# Patient Record
Sex: FEMALE | Race: WHITE | NOT HISPANIC OR LATINO | Employment: OTHER | URBAN - NONMETROPOLITAN AREA
[De-identification: names, ages, dates, MRNs, and addresses within clinical notes are randomized per-mention and may not be internally consistent; named-entity substitution may affect disease eponyms.]

---

## 2023-01-01 ENCOUNTER — INFUSION (OUTPATIENT)
Dept: HEMATOLOGY/ONCOLOGY | Facility: CLINIC | Age: 73
End: 2023-01-01
Payer: MEDICARE

## 2023-01-01 ENCOUNTER — INPATIENT HOSPITAL (INPATIENT)
Dept: URBAN - METROPOLITAN AREA HOSPITAL 50 | Facility: HOSPITAL | Age: 73
End: 2023-01-01

## 2023-01-01 ENCOUNTER — TELEPHONE (OUTPATIENT)
Dept: HEMATOLOGY/ONCOLOGY | Facility: CLINIC | Age: 73
End: 2023-01-01
Payer: MEDICARE

## 2023-01-01 ENCOUNTER — APPOINTMENT (OUTPATIENT)
Dept: HEMATOLOGY/ONCOLOGY | Facility: CLINIC | Age: 73
End: 2023-01-01
Payer: MEDICARE

## 2023-01-01 ENCOUNTER — DOCUMENTATION (OUTPATIENT)
Dept: HEMATOLOGY/ONCOLOGY | Facility: CLINIC | Age: 73
End: 2023-01-01
Payer: MEDICARE

## 2023-01-01 ENCOUNTER — TELEPHONE (OUTPATIENT)
Dept: HEMATOLOGY/ONCOLOGY | Facility: HOSPITAL | Age: 73
End: 2023-01-01
Payer: MEDICARE

## 2023-01-01 ENCOUNTER — LAB (OUTPATIENT)
Dept: LAB | Facility: LAB | Age: 73
End: 2023-01-01
Payer: MEDICARE

## 2023-01-01 ENCOUNTER — OFFICE VISIT (OUTPATIENT)
Dept: HEMATOLOGY/ONCOLOGY | Facility: CLINIC | Age: 73
End: 2023-01-01
Payer: MEDICARE

## 2023-01-01 ENCOUNTER — HOSPITAL ENCOUNTER (OUTPATIENT)
Dept: VASCULAR MEDICINE | Facility: CLINIC | Age: 73
Discharge: HOME | End: 2023-11-14
Payer: MEDICARE

## 2023-01-01 ENCOUNTER — SOCIAL WORK (OUTPATIENT)
Dept: HEMATOLOGY/ONCOLOGY | Facility: CLINIC | Age: 73
End: 2023-01-01
Payer: MEDICARE

## 2023-01-01 ENCOUNTER — DOCUMENTATION (OUTPATIENT)
Dept: HEMATOLOGY/ONCOLOGY | Facility: CLINIC | Age: 73
End: 2023-01-01

## 2023-01-01 VITALS
WEIGHT: 124.89 LBS | HEART RATE: 114 BPM | DIASTOLIC BLOOD PRESSURE: 66 MMHG | RESPIRATION RATE: 16 BRPM | OXYGEN SATURATION: 92 % | SYSTOLIC BLOOD PRESSURE: 107 MMHG | TEMPERATURE: 98.2 F | BODY MASS INDEX: 26.22 KG/M2

## 2023-01-01 VITALS
HEART RATE: 104 BPM | TEMPERATURE: 97.3 F | RESPIRATION RATE: 18 BRPM | OXYGEN SATURATION: 93 % | BODY MASS INDEX: 26.12 KG/M2 | WEIGHT: 124.45 LBS | SYSTOLIC BLOOD PRESSURE: 109 MMHG | DIASTOLIC BLOOD PRESSURE: 70 MMHG

## 2023-01-01 VITALS
BODY MASS INDEX: 25.59 KG/M2 | WEIGHT: 121.91 LBS | HEIGHT: 58 IN | HEART RATE: 107 BPM | TEMPERATURE: 97.7 F | SYSTOLIC BLOOD PRESSURE: 118 MMHG | DIASTOLIC BLOOD PRESSURE: 79 MMHG | OXYGEN SATURATION: 94 % | RESPIRATION RATE: 18 BRPM

## 2023-01-01 VITALS
OXYGEN SATURATION: 93 % | TEMPERATURE: 97.3 F | WEIGHT: 125.77 LBS | SYSTOLIC BLOOD PRESSURE: 97 MMHG | HEART RATE: 94 BPM | BODY MASS INDEX: 26.4 KG/M2 | DIASTOLIC BLOOD PRESSURE: 63 MMHG | RESPIRATION RATE: 14 BRPM

## 2023-01-01 VITALS
BODY MASS INDEX: 26.31 KG/M2 | DIASTOLIC BLOOD PRESSURE: 71 MMHG | OXYGEN SATURATION: 92 % | WEIGHT: 125.33 LBS | HEART RATE: 108 BPM | TEMPERATURE: 97 F | RESPIRATION RATE: 14 BRPM | SYSTOLIC BLOOD PRESSURE: 113 MMHG

## 2023-01-01 VITALS
OXYGEN SATURATION: 94 % | RESPIRATION RATE: 18 BRPM | HEART RATE: 102 BPM | SYSTOLIC BLOOD PRESSURE: 118 MMHG | BODY MASS INDEX: 26.1 KG/M2 | TEMPERATURE: 97.7 F | WEIGHT: 124.34 LBS | DIASTOLIC BLOOD PRESSURE: 72 MMHG

## 2023-01-01 DIAGNOSIS — C83.18 MANTLE CELL LYMPHOMA OF LYMPH NODES OF MULTIPLE REGIONS (MULTI): ICD-10-CM

## 2023-01-01 DIAGNOSIS — C83.10 MANTLE CELL LYMPHOMA, UNSPECIFIED SITE: ICD-10-CM

## 2023-01-01 DIAGNOSIS — K92.1 MELENA: ICD-10-CM

## 2023-01-01 DIAGNOSIS — C83.18 MANTLE CELL LYMPHOMA OF LYMPH NODES OF MULTIPLE REGIONS (MULTI): Primary | ICD-10-CM

## 2023-01-01 DIAGNOSIS — A04.72 ENTEROCOLITIS DUE TO CLOSTRIDIUM DIFFICILE, NOT SPECIFIED AS: ICD-10-CM

## 2023-01-01 DIAGNOSIS — D62 ACUTE POSTHEMORRHAGIC ANEMIA: ICD-10-CM

## 2023-01-01 DIAGNOSIS — M79.604 PAIN IN RIGHT LEG: ICD-10-CM

## 2023-01-01 DIAGNOSIS — C83.13 MANTLE CELL LYMPHOMA, INTRA-ABDOMINAL LYMPH NODES: ICD-10-CM

## 2023-01-01 DIAGNOSIS — C83.10 MANTLE CELL LYMPHOMA, UNSPECIFIED SITE (MULTI): Primary | ICD-10-CM

## 2023-01-01 DIAGNOSIS — K29.50 UNSPECIFIED CHRONIC GASTRITIS WITHOUT BLEEDING: ICD-10-CM

## 2023-01-01 LAB
ACANTHOCYTES PRESENCE IN BLOOD BY LIGHT MICROSCOPY: NORMAL
ALANINE AMINOTRANSFERASE (SGPT) (U/L) IN SER/PLAS: 11 U/L (ref 7–45)
ALANINE AMINOTRANSFERASE (SGPT) (U/L) IN SER/PLAS: 9 U/L (ref 7–45)
ALBUMIN (G/DL) IN SER/PLAS: 4 G/DL (ref 3.4–5)
ALBUMIN (G/DL) IN SER/PLAS: 4.1 G/DL (ref 3.4–5)
ALBUMIN ELP: 3.6 G/DL (ref 3.4–5)
ALBUMIN ELP: 3.9 G/DL (ref 3.4–5)
ALBUMIN SERPL BCP-MCNC: 3.7 G/DL (ref 3.4–5)
ALBUMIN SERPL BCP-MCNC: 4 G/DL (ref 3.4–5)
ALBUMIN: 3.4 G/DL (ref 3.4–5)
ALBUMIN: 3.8 G/DL (ref 3.4–5)
ALKALINE PHOSPHATASE (U/L) IN SER/PLAS: 117 U/L (ref 33–136)
ALKALINE PHOSPHATASE (U/L) IN SER/PLAS: 118 U/L (ref 33–136)
ALP SERPL-CCNC: 126 U/L (ref 33–136)
ALP SERPL-CCNC: 161 U/L (ref 33–136)
ALPHA 1 GLOBULIN: 0.3 G/DL (ref 0.2–0.6)
ALPHA 1 GLOBULIN: 0.4 G/DL (ref 0.2–0.6)
ALPHA 1: 0.3 G/DL (ref 0.2–0.6)
ALPHA 1: 0.4 G/DL (ref 0.2–0.6)
ALPHA 2 GLOBULIN: 0.9 G/DL (ref 0.4–1.1)
ALPHA 2 GLOBULIN: 1 G/DL (ref 0.4–1.1)
ALPHA 2: 0.9 G/DL (ref 0.4–1.1)
ALPHA 2: 0.9 G/DL (ref 0.4–1.1)
ALT SERPL W P-5'-P-CCNC: 10 U/L (ref 7–45)
ALT SERPL W P-5'-P-CCNC: 12 U/L (ref 7–45)
ANION GAP IN SER/PLAS: 12 MMOL/L (ref 10–20)
ANION GAP IN SER/PLAS: 14 MMOL/L (ref 10–20)
ANION GAP SERPL CALC-SCNC: 13 MMOL/L (ref 10–20)
ANION GAP SERPL CALC-SCNC: 15 MMOL/L (ref 10–20)
ASPARTATE AMINOTRANSFERASE (SGOT) (U/L) IN SER/PLAS: 19 U/L (ref 9–39)
ASPARTATE AMINOTRANSFERASE (SGOT) (U/L) IN SER/PLAS: 20 U/L (ref 9–39)
AST SERPL W P-5'-P-CCNC: 19 U/L (ref 9–39)
AST SERPL W P-5'-P-CCNC: 23 U/L (ref 9–39)
B2 MICROGLOB SERPL-MCNC: 5.1 MG/L (ref 0.7–2.2)
B2 MICROGLOB SERPL-MCNC: 6.2 MG/L (ref 0.7–2.2)
BASOPHILS # BLD MANUAL: 0 X10*3/UL (ref 0–0.1)
BASOPHILS # BLD MANUAL: 0.59 X10*3/UL (ref 0–0.1)
BASOPHILS (10*3/UL) IN BLOOD BY MANUAL COUNT - WAM: 0 X10E9/L (ref 0–0.1)
BASOPHILS (10*3/UL) IN BLOOD BY MANUAL COUNT - WAM: 0 X10E9/L (ref 0–0.1)
BASOPHILS NFR BLD MANUAL: 0 %
BASOPHILS NFR BLD MANUAL: 0.7 %
BASOPHILS/100 LEUKOCYTES IN BLOOD BY MANUAL COUNT - WAM: 0 % (ref 0–2)
BASOPHILS/100 LEUKOCYTES IN BLOOD BY MANUAL COUNT - WAM: 0 % (ref 0–2)
BETA GLOBULIN: 0.8 G/DL (ref 0.5–1.2)
BETA GLOBULIN: 1 G/DL (ref 0.5–1.2)
BETA-2-MICROGLOBULIN (MG/L) IN SERUM: 4.2 MG/L (ref 0.7–2.2)
BETA-2-MICROGLOBULIN (MG/L) IN SERUM: 4.5 MG/L (ref 0.7–2.2)
BETA: 0.8 G/DL (ref 0.5–1.2)
BETA: 0.8 G/DL (ref 0.5–1.2)
BILIRUB SERPL-MCNC: 0.3 MG/DL (ref 0–1.2)
BILIRUB SERPL-MCNC: 0.4 MG/DL (ref 0–1.2)
BILIRUBIN TOTAL (MG/DL) IN SER/PLAS: 0.4 MG/DL (ref 0–1.2)
BILIRUBIN TOTAL (MG/DL) IN SER/PLAS: 0.4 MG/DL (ref 0–1.2)
BUN SERPL-MCNC: 18 MG/DL (ref 6–23)
BUN SERPL-MCNC: 18 MG/DL (ref 6–23)
BURR CELLS BLD QL SMEAR: ABNORMAL
BURR CELLS PRESENCE IN BLOOD BY LIGHT MICROSCOPY: NORMAL
CALCIUM (MG/DL) IN SER/PLAS: 10 MG/DL (ref 8.6–10.6)
CALCIUM (MG/DL) IN SER/PLAS: 9.2 MG/DL (ref 8.6–10.6)
CALCIUM SERPL-MCNC: 10.1 MG/DL (ref 8.6–10.6)
CALCIUM SERPL-MCNC: 9.3 MG/DL (ref 8.6–10.6)
CARBON DIOXIDE, TOTAL (MMOL/L) IN SER/PLAS: 28 MMOL/L (ref 21–32)
CARBON DIOXIDE, TOTAL (MMOL/L) IN SER/PLAS: 32 MMOL/L (ref 21–32)
CBC DIFFERENTIAL PATH REVIEW: NORMAL
CHLORIDE (MMOL/L) IN SER/PLAS: 102 MMOL/L (ref 98–107)
CHLORIDE (MMOL/L) IN SER/PLAS: 105 MMOL/L (ref 98–107)
CHLORIDE SERPL-SCNC: 103 MMOL/L (ref 98–107)
CHLORIDE SERPL-SCNC: 107 MMOL/L (ref 98–107)
CO2 SERPL-SCNC: 27 MMOL/L (ref 21–32)
CO2 SERPL-SCNC: 28 MMOL/L (ref 21–32)
CREAT SERPL-MCNC: 0.89 MG/DL (ref 0.5–1.05)
CREAT SERPL-MCNC: 0.9 MG/DL (ref 0.5–1.05)
CREATININE (MG/DL) IN SER/PLAS: 0.75 MG/DL (ref 0.5–1.05)
CREATININE (MG/DL) IN SER/PLAS: 0.92 MG/DL (ref 0.5–1.05)
DACROCYTES PRESENCE IN BLOOD BY LIGHT MICROSCOPY: NORMAL
EOSINOPHIL # BLD MANUAL: 0 X10*3/UL (ref 0–0.4)
EOSINOPHIL # BLD MANUAL: 0.59 X10*3/UL (ref 0–0.4)
EOSINOPHIL NFR BLD MANUAL: 0 %
EOSINOPHIL NFR BLD MANUAL: 0.7 %
EOSINOPHILS (10*3/UL) IN BLOOD BY MANUAL COUNT - WAM: 0 X10E9/L (ref 0–0.4)
EOSINOPHILS (10*3/UL) IN BLOOD BY MANUAL COUNT - WAM: 0.57 X10E9/L (ref 0–0.4)
EOSINOPHILS/100 LEUKOCYTES IN BLOOD BY MANUAL COUNT - WAM: 0 % (ref 0–6)
EOSINOPHILS/100 LEUKOCYTES IN BLOOD BY MANUAL COUNT - WAM: 0.7 % (ref 0–6)
ERYTHROCYTE DISTRIBUTION WIDTH (RATIO) BY AUTOMATED COUNT: 15.7 % (ref 11.5–14.5)
ERYTHROCYTE DISTRIBUTION WIDTH (RATIO) BY AUTOMATED COUNT: 15.8 % (ref 11.5–14.5)
ERYTHROCYTE MEAN CORPUSCULAR HEMOGLOBIN CONCENTRATION (G/DL) BY AUTOMATED: 30.6 G/DL (ref 32–36)
ERYTHROCYTE MEAN CORPUSCULAR HEMOGLOBIN CONCENTRATION (G/DL) BY AUTOMATED: 31.2 G/DL (ref 32–36)
ERYTHROCYTE MEAN CORPUSCULAR VOLUME (FL) BY AUTOMATED COUNT: 92 FL (ref 80–100)
ERYTHROCYTE MEAN CORPUSCULAR VOLUME (FL) BY AUTOMATED COUNT: 92 FL (ref 80–100)
ERYTHROCYTE [DISTWIDTH] IN BLOOD BY AUTOMATED COUNT: 15.8 % (ref 11.5–14.5)
ERYTHROCYTE [DISTWIDTH] IN BLOOD BY AUTOMATED COUNT: 17.5 % (ref 11.5–14.5)
ERYTHROCYTES (10*6/UL) IN BLOOD BY AUTOMATED COUNT: 4.95 X10E12/L (ref 4–5.2)
ERYTHROCYTES (10*6/UL) IN BLOOD BY AUTOMATED COUNT: 5.21 X10E12/L (ref 4–5.2)
FERRITIN SERPL-MCNC: 34 NG/ML (ref 8–150)
FERRITIN SERPL-MCNC: 86 NG/ML (ref 8–150)
FOLATE SERPL-MCNC: 14.9 NG/ML
GAMMA GLOBULIN: 0.3 G/DL (ref 0.5–1.4)
GAMMA GLOBULIN: 0.4 G/DL (ref 0.5–1.4)
GAMMA GLOBULIN: 0.4 G/DL (ref 0.5–1.4)
GAMMA GLOBULIN: 0.7 G/DL (ref 0.5–1.4)
GFR FEMALE: 66 ML/MIN/1.73M2
GFR FEMALE: 84 ML/MIN/1.73M2
GFR SERPL CREATININE-BSD FRML MDRD: 67 ML/MIN/1.73M*2
GFR SERPL CREATININE-BSD FRML MDRD: 68 ML/MIN/1.73M*2
GLUCOSE (MG/DL) IN SER/PLAS: 77 MG/DL (ref 74–99)
GLUCOSE (MG/DL) IN SER/PLAS: 90 MG/DL (ref 74–99)
GLUCOSE SERPL-MCNC: 113 MG/DL (ref 74–99)
GLUCOSE SERPL-MCNC: 97 MG/DL (ref 74–99)
HAPTOGLOB SERPL-MCNC: 199 MG/DL (ref 37–246)
HBV CORE AB SER QL: NONREACTIVE
HBV SURFACE AB SER-ACNC: 92 MIU/ML
HBV SURFACE AG SERPL QL IA: NONREACTIVE
HCT VFR BLD AUTO: 41.3 % (ref 36–46)
HCT VFR BLD AUTO: 46.6 % (ref 36–46)
HEMATOCRIT (%) IN BLOOD BY AUTOMATED COUNT: 45.7 % (ref 36–46)
HEMATOCRIT (%) IN BLOOD BY AUTOMATED COUNT: 48.1 % (ref 36–46)
HEMOGLOBIN (G/DL) IN BLOOD: 14 G/DL (ref 12–16)
HEMOGLOBIN (G/DL) IN BLOOD: 15 G/DL (ref 12–16)
HGB BLD-MCNC: 12.9 G/DL (ref 12–16)
HGB BLD-MCNC: 13.7 G/DL (ref 12–16)
IGA SERPL-MCNC: 198 MG/DL (ref 70–400)
IGG SERPL-MCNC: 929 MG/DL (ref 700–1600)
IGM SERPL-MCNC: 286 MG/DL (ref 40–230)
IMM GRANULOCYTES # BLD AUTO: 0.13 X10*3/UL (ref 0–0.5)
IMM GRANULOCYTES # BLD AUTO: 0.2 X10*3/UL (ref 0–0.5)
IMM GRANULOCYTES NFR BLD AUTO: 0.1 % (ref 0–0.9)
IMM GRANULOCYTES NFR BLD AUTO: 0.2 % (ref 0–0.9)
IMMATURE GRANULOCYTES/100 LEUKOCYTES IN BLOOD BY AUTOMATED COUNT: 0.1 % (ref 0–0.9)
IMMATURE GRANULOCYTES/100 LEUKOCYTES IN BLOOD BY AUTOMATED COUNT: 0.3 % (ref 0–0.9)
IMMUNOFIXATION COMMENT: ABNORMAL
IMMUNOFIXATION COMMENT: ABNORMAL
IMMUNOGLOBULIN LIGHT CHAINS KAPPA/LAMBDA (MASS RATIO) IN SERUM: 0.36 (ref 0.26–1.65)
IMMUNOGLOBULIN LIGHT CHAINS KAPPA/LAMBDA (MASS RATIO) IN SERUM: 0.36 (ref 0.26–1.65)
IMMUNOGLOBULIN LIGHT CHAINS.KAPPA (MG/DL) IN SERUM: 1.03 MG/DL (ref 0.33–1.94)
IMMUNOGLOBULIN LIGHT CHAINS.KAPPA (MG/DL) IN SERUM: 1.11 MG/DL (ref 0.33–1.94)
IMMUNOGLOBULIN LIGHT CHAINS.LAMBDA (MG/DL) IN SERUM: 2.84 MG/DL (ref 0.57–2.63)
IMMUNOGLOBULIN LIGHT CHAINS.LAMBDA (MG/DL) IN SERUM: 3.07 MG/DL (ref 0.57–2.63)
IRON SATN MFR SERPL: 16 % (ref 25–45)
IRON SATN MFR SERPL: 21 % (ref 25–45)
IRON SERPL-MCNC: 56 UG/DL (ref 35–150)
IRON SERPL-MCNC: 77 UG/DL (ref 35–150)
KAPPA LC SERPL-MCNC: 1.43 MG/DL (ref 0.33–1.94)
KAPPA LC SERPL-MCNC: 1.83 MG/DL (ref 0.33–1.94)
KAPPA LC/LAMBDA SER: 0.36 {RATIO} (ref 0.26–1.65)
KAPPA LC/LAMBDA SER: 0.38 {RATIO} (ref 0.26–1.65)
LACTATE DEHYDROGENASE (U/L) IN SER/PLAS BY LAC->PYR RXN: 171 U/L (ref 84–246)
LACTATE DEHYDROGENASE (U/L) IN SER/PLAS BY LAC->PYR RXN: 184 U/L (ref 84–246)
LAMBDA LC SERPL-MCNC: 3.95 MG/DL (ref 0.57–2.63)
LAMBDA LC SERPL-MCNC: 4.79 MG/DL (ref 0.57–2.63)
LDH SERPL L TO P-CCNC: 189 U/L (ref 84–246)
LDH SERPL L TO P-CCNC: 205 U/L (ref 84–246)
LEUKOCYTES (10*3/UL) IN BLOOD BY AUTOMATED COUNT: 64.4 X10E9/L (ref 4.4–11.3)
LEUKOCYTES (10*3/UL) IN BLOOD BY AUTOMATED COUNT: 81.1 X10E9/L (ref 4.4–11.3)
LYMPHOCYTES # BLD MANUAL: 65.86 X10*3/UL (ref 0.8–3)
LYMPHOCYTES # BLD MANUAL: 75.43 X10*3/UL (ref 0.8–3)
LYMPHOCYTES (10*3/UL) IN BLOOD BY MANUAL COUNT - WAM: 24.47 X10E9/L (ref 0.8–3)
LYMPHOCYTES (10*3/UL) IN BLOOD BY MANUAL COUNT - WAM: 67.07 X10E9/L (ref 0.8–3)
LYMPHOCYTES NFR BLD MANUAL: 78.4 %
LYMPHOCYTES NFR BLD MANUAL: 84 %
LYMPHOCYTES VARIANT/100 LEUKOCYTES IN BLOOD - WAM: 2.9 % (ref 0–2)
LYMPHOCYTES VARIANT/100 LEUKOCYTES IN BLOOD - WAM: 53 % (ref 0–2)
LYMPHOCYTES/100 LEUKOCYTES IN BLOOD BY MANUAL COUNT - WAM: 38 % (ref 13–44)
LYMPHOCYTES/100 LEUKOCYTES IN BLOOD BY MANUAL COUNT - WAM: 82.7 % (ref 13–44)
M-PROTEIN 1: 0.1 G/DL
M-PROTEIN 1: 0.2 G/DL
MANUAL DIFFERENTIAL Y/N: ABNORMAL
MANUAL DIFFERENTIAL Y/N: ABNORMAL
MCH RBC QN AUTO: 28 PG (ref 26–34)
MCH RBC QN AUTO: 28.4 PG (ref 26–34)
MCHC RBC AUTO-ENTMCNC: 29.4 G/DL (ref 32–36)
MCHC RBC AUTO-ENTMCNC: 31.2 G/DL (ref 32–36)
MCV RBC AUTO: 91 FL (ref 80–100)
MCV RBC AUTO: 95 FL (ref 80–100)
METAMYELOCYTES (10*3/UL) IN BLOOD BY MANUAL COUNT - WAM: 0.57 X10E9/L (ref 0–0)
METAMYELOCYTES/100 LEUKOCYTES IN BLOOD BY MANUAL COUNT - WAM: 0.7 % (ref 0–0)
MONOCYTES # BLD MANUAL: 2.44 X10*3/UL (ref 0.05–0.8)
MONOCYTES # BLD MANUAL: 2.69 X10*3/UL (ref 0.05–0.8)
MONOCYTES (10*3/UL) IN BLOOD BY MANUAL COUNT - WAM: 1.22 X10E9/L (ref 0.05–0.8)
MONOCYTES (10*3/UL) IN BLOOD BY MANUAL COUNT - WAM: 1.29 X10E9/L (ref 0.05–0.8)
MONOCYTES NFR BLD MANUAL: 2.9 %
MONOCYTES NFR BLD MANUAL: 3 %
MONOCYTES/100 LEUKOCYTES IN BLOOD BY MANUAL COUNT - WAM: 1.5 % (ref 2–10)
MONOCYTES/100 LEUKOCYTES IN BLOOD BY MANUAL COUNT - WAM: 2 % (ref 2–10)
NEUTROPHILS (SEGS+BANDS) (10*3/UL) MANUAL COUNT - WAM: 4.51 X10E9/L (ref 1.6–5.5)
NEUTROPHILS (SEGS+BANDS) (10*3/UL) MANUAL COUNT - WAM: 9.33 X10E9/L (ref 1.6–5.5)
NEUTS SEG # BLD MANUAL: 2.44 X10*3/UL (ref 1.6–5)
NEUTS SEG # BLD MANUAL: 4.49 X10*3/UL (ref 1.6–5)
NEUTS SEG NFR BLD MANUAL: 2.9 %
NEUTS SEG NFR BLD MANUAL: 5 %
NRBC (PER 100 WBCS) BY AUTOMATED COUNT: 0 /100 WBC (ref 0–0)
NRBC BLD-RTO: 0 /100 WBCS (ref 0–0)
NRBC BLD-RTO: ABNORMAL /100{WBCS}
OVALOCYTES BLD QL SMEAR: ABNORMAL
PATH REVIEW - SERUM IMMUNOFIXATION: ABNORMAL
PATH REVIEW - SERUM IMMUNOFIXATION: ABNORMAL
PATH REVIEW - SERUM IMMUNOFIXATION: NORMAL
PATH REVIEW - SERUM IMMUNOFIXATION: NORMAL
PATH REVIEW-SERUM PROTEIN ELECTROPHORESIS: ABNORMAL
PATH REVIEW-SERUM PROTEIN ELECTROPHORESIS: ABNORMAL
PATH REVIEW-SERUM PROTEIN ELECTROPHORESIS: NORMAL
PATH REVIEW-SERUM PROTEIN ELECTROPHORESIS: NORMAL
PLATELET # BLD AUTO: 293 X10*3/UL (ref 150–450)
PLATELET # BLD AUTO: 335 X10*3/UL (ref 150–450)
PLATELETS (10*3/UL) IN BLOOD AUTOMATED COUNT: 313 X10E9/L (ref 150–450)
PLATELETS (10*3/UL) IN BLOOD AUTOMATED COUNT: 321 X10E9/L (ref 150–450)
PMV BLD AUTO: 10.6 FL (ref 7.5–11.5)
PMV BLD AUTO: 9.4 FL (ref 7.5–11.5)
POTASSIUM (MMOL/L) IN SER/PLAS: 4.5 MMOL/L (ref 3.5–5.3)
POTASSIUM (MMOL/L) IN SER/PLAS: 4.9 MMOL/L (ref 3.5–5.3)
POTASSIUM SERPL-SCNC: 4.1 MMOL/L (ref 3.5–5.3)
POTASSIUM SERPL-SCNC: 4.7 MMOL/L (ref 3.5–5.3)
PROT SERPL-MCNC: 5.9 G/DL (ref 6.4–8.2)
PROT SERPL-MCNC: 5.9 G/DL (ref 6.4–8.2)
PROT SERPL-MCNC: 6.9 G/DL (ref 6.4–8.2)
PROT SERPL-MCNC: 6.9 G/DL (ref 6.4–8.2)
PROTEIN ELECTROPHORESIS COMMENT: ABNORMAL
PROTEIN ELECTROPHORESIS COMMENT: ABNORMAL
PROTEIN ELECTROPHORESIS INTERPRETATION: ABNORMAL
PROTEIN ELECTROPHORESIS INTERPRETATION: ABNORMAL
PROTEIN TOTAL: 5.9 G/DL (ref 6.4–8.2)
PROTEIN TOTAL: 5.9 G/DL (ref 6.4–8.2)
PROTEIN TOTAL: 6.2 G/DL (ref 6.4–8.2)
PROTEIN TOTAL: 6.2 G/DL (ref 6.4–8.2)
RBC # BLD AUTO: 4.55 X10*6/UL (ref 4–5.2)
RBC # BLD AUTO: 4.9 X10*6/UL (ref 4–5.2)
RBC MORPH BLD: ABNORMAL
RBC MORPH BLD: ABNORMAL
RBC MORPHOLOGY IN BLOOD: NORMAL
RBC MORPHOLOGY IN BLOOD: NORMAL
SCHISTOCYTES BLD QL SMEAR: ABNORMAL
SEGMENTED NEUTROPHILS (10*3/UL) BLOOD MANUAL - WAM: 4.51 X10E9/L (ref 1.6–5)
SEGMENTED NEUTROPHILS (10*3/UL) BLOOD MANUAL - WAM: 9.33 X10E9/L (ref 1.6–5)
SEGMENTED NEUTROPHILS/100 LEUKOCYTES BY MANUAL COUNT -: 11.5 % (ref 40–80)
SEGMENTED NEUTROPHILS/100 LEUKOCYTES BY MANUAL COUNT -: 7 % (ref 40–80)
SERUM IMMUNOFIXATION INTERPRETATION: ABNORMAL
SERUM IMMUNOFIXATION INTERPRETATION: ABNORMAL
SODIUM (MMOL/L) IN SER/PLAS: 140 MMOL/L (ref 136–145)
SODIUM (MMOL/L) IN SER/PLAS: 143 MMOL/L (ref 136–145)
SODIUM SERPL-SCNC: 141 MMOL/L (ref 136–145)
SODIUM SERPL-SCNC: 143 MMOL/L (ref 136–145)
TARGET CELLS IN BLOOD BY LIGHT MICROSCOPY: NORMAL
TARGETS BLD QL SMEAR: ABNORMAL
TARGETS BLD QL SMEAR: ABNORMAL
TIBC SERPL-MCNC: 358 UG/DL (ref 240–445)
TIBC SERPL-MCNC: 359 UG/DL (ref 240–445)
TOTAL CELLS COUNTED BLD: 100
TOTAL CELLS COUNTED BLD: 139
TSH SERPL-ACNC: 1.74 MIU/L (ref 0.44–3.98)
UIBC SERPL-MCNC: 282 UG/DL (ref 110–370)
UIBC SERPL-MCNC: 302 UG/DL (ref 110–370)
URATE SERPL-MCNC: 4.9 MG/DL (ref 2.3–6.7)
UREA NITROGEN (MG/DL) IN SER/PLAS: 16 MG/DL (ref 6–23)
UREA NITROGEN (MG/DL) IN SER/PLAS: 18 MG/DL (ref 6–23)
VARIANT LYMPHOCYTES (10*3/UL) BLOOD MANUAL COUNT - WAM: 2.35 X10E9/L (ref 0–0.3)
VARIANT LYMPHOCYTES (10*3/UL) BLOOD MANUAL COUNT - WAM: 34.13 X10E9/L (ref 0–0.3)
VARIANT LYMPHS # BLD MANUAL: 12.1 X10*3/UL (ref 0–0.3)
VARIANT LYMPHS # BLD MANUAL: 7.18 X10*3/UL (ref 0–0.3)
VARIANT LYMPHS NFR BLD: 14.4 %
VARIANT LYMPHS NFR BLD: 8 %
VIT B12 SERPL-MCNC: 366 PG/ML (ref 211–911)
WBC # BLD AUTO: 84 X10*3/UL (ref 4.4–11.3)
WBC # BLD AUTO: 89.8 X10*3/UL (ref 4.4–11.3)

## 2023-01-01 PROCEDURE — 2500000004 HC RX 250 GENERAL PHARMACY W/ HCPCS (ALT 636 FOR OP/ED): Performed by: INTERNAL MEDICINE

## 2023-01-01 PROCEDURE — 86704 HEP B CORE ANTIBODY TOTAL: CPT | Performed by: INTERNAL MEDICINE

## 2023-01-01 PROCEDURE — 1159F MED LIST DOCD IN RCRD: CPT | Performed by: INTERNAL MEDICINE

## 2023-01-01 PROCEDURE — 96375 TX/PRO/DX INJ NEW DRUG ADDON: CPT | Mod: INF

## 2023-01-01 PROCEDURE — 96409 CHEMO IV PUSH SNGL DRUG: CPT

## 2023-01-01 PROCEDURE — 96377 APPLICATON ON-BODY INJECTOR: CPT

## 2023-01-01 PROCEDURE — 93970 EXTREMITY STUDY: CPT

## 2023-01-01 PROCEDURE — 96413 CHEMO IV INFUSION 1 HR: CPT

## 2023-01-01 PROCEDURE — 43239 EGD BIOPSY SINGLE/MULTIPLE: CPT | Performed by: INTERNAL MEDICINE

## 2023-01-01 PROCEDURE — 99215 OFFICE O/P EST HI 40 MIN: CPT | Performed by: INTERNAL MEDICINE

## 2023-01-01 PROCEDURE — 85007 BL SMEAR W/DIFF WBC COUNT: CPT | Performed by: INTERNAL MEDICINE

## 2023-01-01 PROCEDURE — 87340 HEPATITIS B SURFACE AG IA: CPT | Performed by: INTERNAL MEDICINE

## 2023-01-01 PROCEDURE — 83010 ASSAY OF HAPTOGLOBIN QUANT: CPT | Performed by: INTERNAL MEDICINE

## 2023-01-01 PROCEDURE — 86334 IMMUNOFIX E-PHORESIS SERUM: CPT | Performed by: INTERNAL MEDICINE

## 2023-01-01 PROCEDURE — 2500000004 HC RX 250 GENERAL PHARMACY W/ HCPCS (ALT 636 FOR OP/ED): Mod: JW,JG | Performed by: INTERNAL MEDICINE

## 2023-01-01 PROCEDURE — 99233 SBSQ HOSP IP/OBS HIGH 50: CPT | Mod: FS

## 2023-01-01 PROCEDURE — 83550 IRON BINDING TEST: CPT | Performed by: INTERNAL MEDICINE

## 2023-01-01 PROCEDURE — 84165 PROTEIN E-PHORESIS SERUM: CPT | Performed by: INTERNAL MEDICINE

## 2023-01-01 PROCEDURE — 82728 ASSAY OF FERRITIN: CPT | Performed by: INTERNAL MEDICINE

## 2023-01-01 PROCEDURE — 82232 ASSAY OF BETA-2 PROTEIN: CPT | Performed by: INTERNAL MEDICINE

## 2023-01-01 PROCEDURE — 84155 ASSAY OF PROTEIN SERUM: CPT | Performed by: INTERNAL MEDICINE

## 2023-01-01 PROCEDURE — 84443 ASSAY THYROID STIM HORMONE: CPT | Performed by: INTERNAL MEDICINE

## 2023-01-01 PROCEDURE — 82746 ASSAY OF FOLIC ACID SERUM: CPT | Performed by: INTERNAL MEDICINE

## 2023-01-01 PROCEDURE — 36591 DRAW BLOOD OFF VENOUS DEVICE: CPT

## 2023-01-01 PROCEDURE — 83521 IG LIGHT CHAINS FREE EACH: CPT | Performed by: INTERNAL MEDICINE

## 2023-01-01 PROCEDURE — 93970 EXTREMITY STUDY: CPT | Performed by: SURGERY

## 2023-01-01 PROCEDURE — 2500000001 HC RX 250 WO HCPCS SELF ADMINISTERED DRUGS (ALT 637 FOR MEDICARE OP): Performed by: INTERNAL MEDICINE

## 2023-01-01 PROCEDURE — 99222 1ST HOSP IP/OBS MODERATE 55: CPT | Performed by: INTERNAL MEDICINE

## 2023-01-01 PROCEDURE — 84075 ASSAY ALKALINE PHOSPHATASE: CPT | Performed by: INTERNAL MEDICINE

## 2023-01-01 PROCEDURE — 86706 HEP B SURFACE ANTIBODY: CPT | Performed by: INTERNAL MEDICINE

## 2023-01-01 PROCEDURE — 83615 LACTATE (LD) (LDH) ENZYME: CPT | Performed by: INTERNAL MEDICINE

## 2023-01-01 PROCEDURE — 85027 COMPLETE CBC AUTOMATED: CPT | Performed by: INTERNAL MEDICINE

## 2023-01-01 PROCEDURE — 1126F AMNT PAIN NOTED NONE PRSNT: CPT | Performed by: INTERNAL MEDICINE

## 2023-01-01 PROCEDURE — 36415 COLL VENOUS BLD VENIPUNCTURE: CPT

## 2023-01-01 PROCEDURE — 86320 SERUM IMMUNOELECTROPHORESIS: CPT | Performed by: INTERNAL MEDICINE

## 2023-01-01 PROCEDURE — 2500000004 HC RX 250 GENERAL PHARMACY W/ HCPCS (ALT 636 FOR OP/ED): Mod: JZ | Performed by: INTERNAL MEDICINE

## 2023-01-01 PROCEDURE — 82607 VITAMIN B-12: CPT | Performed by: INTERNAL MEDICINE

## 2023-01-01 PROCEDURE — 96415 CHEMO IV INFUSION ADDL HR: CPT

## 2023-01-01 PROCEDURE — 84550 ASSAY OF BLOOD/URIC ACID: CPT | Performed by: INTERNAL MEDICINE

## 2023-01-01 PROCEDURE — 99215 OFFICE O/P EST HI 40 MIN: CPT | Mod: PO | Performed by: INTERNAL MEDICINE

## 2023-01-01 RX ORDER — HEPARIN SODIUM,PORCINE/PF 10 UNIT/ML
50 SYRINGE (ML) INTRAVENOUS AS NEEDED
Status: CANCELLED | OUTPATIENT
Start: 2023-01-01

## 2023-01-01 RX ORDER — LORAZEPAM 0.5 MG/1
0.5 TABLET ORAL 4 TIMES DAILY
COMMUNITY

## 2023-01-01 RX ORDER — EPINEPHRINE 0.3 MG/.3ML
0.3 INJECTION SUBCUTANEOUS EVERY 5 MIN PRN
Status: CANCELLED | OUTPATIENT
Start: 2023-01-01

## 2023-01-01 RX ORDER — PROCHLORPERAZINE MALEATE 10 MG
10 TABLET ORAL EVERY 6 HOURS PRN
Status: CANCELLED | OUTPATIENT
Start: 2023-12-06

## 2023-01-01 RX ORDER — ALBUTEROL SULFATE 0.83 MG/ML
3 SOLUTION RESPIRATORY (INHALATION) AS NEEDED
Status: DISCONTINUED | OUTPATIENT
Start: 2023-01-01 | End: 2023-01-01 | Stop reason: HOSPADM

## 2023-01-01 RX ORDER — FAMOTIDINE 10 MG/ML
20 INJECTION INTRAVENOUS ONCE AS NEEDED
Status: CANCELLED | OUTPATIENT
Start: 2023-01-01

## 2023-01-01 RX ORDER — ACETAMINOPHEN 325 MG/1
650 TABLET ORAL ONCE
Status: COMPLETED | OUTPATIENT
Start: 2023-01-01 | End: 2023-01-01

## 2023-01-01 RX ORDER — ONDANSETRON HYDROCHLORIDE 8 MG/1
8 TABLET, FILM COATED ORAL 2 TIMES DAILY PRN
Qty: 30 TABLET | Refills: 1 | Status: SHIPPED | OUTPATIENT
Start: 2023-01-01 | End: 2023-01-01

## 2023-01-01 RX ORDER — DIPHENHYDRAMINE HYDROCHLORIDE 50 MG/ML
50 INJECTION INTRAMUSCULAR; INTRAVENOUS AS NEEDED
Status: CANCELLED | OUTPATIENT
Start: 2023-01-01

## 2023-01-01 RX ORDER — PROCHLORPERAZINE EDISYLATE 5 MG/ML
10 INJECTION INTRAMUSCULAR; INTRAVENOUS EVERY 6 HOURS PRN
Status: DISCONTINUED | OUTPATIENT
Start: 2023-01-01 | End: 2023-01-01 | Stop reason: HOSPADM

## 2023-01-01 RX ORDER — HEPARIN 100 UNIT/ML
500 SYRINGE INTRAVENOUS AS NEEDED
Status: CANCELLED | OUTPATIENT
Start: 2023-01-01

## 2023-01-01 RX ORDER — IBUPROFEN 100 MG/5ML
1000 SUSPENSION, ORAL (FINAL DOSE FORM) ORAL DAILY
COMMUNITY

## 2023-01-01 RX ORDER — OMEPRAZOLE 40 MG/1
CAPSULE, DELAYED RELEASE ORAL
COMMUNITY
Start: 2023-01-01

## 2023-01-01 RX ORDER — BUDESONIDE, GLYCOPYRROLATE, AND FORMOTEROL FUMARATE 160; 9; 4.8 UG/1; UG/1; UG/1
2 AEROSOL, METERED RESPIRATORY (INHALATION)
COMMUNITY
Start: 2021-06-30

## 2023-01-01 RX ORDER — ATORVASTATIN CALCIUM 80 MG/1
80 TABLET, FILM COATED ORAL DAILY
COMMUNITY
Start: 2023-01-01

## 2023-01-01 RX ORDER — ACETAMINOPHEN 325 MG/1
650 TABLET ORAL EVERY 6 HOURS PRN
Status: DISCONTINUED | OUTPATIENT
Start: 2023-01-01 | End: 2023-01-01 | Stop reason: HOSPADM

## 2023-01-01 RX ORDER — HEPARIN SODIUM,PORCINE/PF 10 UNIT/ML
50 SYRINGE (ML) INTRAVENOUS AS NEEDED
Status: DISCONTINUED | OUTPATIENT
Start: 2023-01-01 | End: 2023-01-01 | Stop reason: HOSPADM

## 2023-01-01 RX ORDER — DIPHENHYDRAMINE HYDROCHLORIDE 50 MG/ML
50 INJECTION INTRAMUSCULAR; INTRAVENOUS AS NEEDED
Status: CANCELLED | OUTPATIENT
Start: 2023-12-06

## 2023-01-01 RX ORDER — DIPHENHYDRAMINE HCL 25 MG
25 CAPSULE ORAL ONCE
Status: COMPLETED | OUTPATIENT
Start: 2023-01-01 | End: 2023-01-01

## 2023-01-01 RX ORDER — NITROGLYCERIN 0.4 MG/1
0.4 TABLET SUBLINGUAL DAILY PRN
COMMUNITY

## 2023-01-01 RX ORDER — ALLOPURINOL 300 MG/1
TABLET ORAL
Qty: 14 TABLET | Refills: 0 | Status: SHIPPED | OUTPATIENT
Start: 2023-01-01 | End: 2023-11-29

## 2023-01-01 RX ORDER — DOXYCYCLINE HYCLATE 100 MG
100 TABLET ORAL DAILY
COMMUNITY
Start: 2023-01-01

## 2023-01-01 RX ORDER — PROCHLORPERAZINE MALEATE 10 MG
10 TABLET ORAL EVERY 6 HOURS PRN
Status: CANCELLED | OUTPATIENT
Start: 2023-12-07

## 2023-01-01 RX ORDER — EPINEPHRINE 0.3 MG/.3ML
0.3 INJECTION SUBCUTANEOUS EVERY 5 MIN PRN
Status: CANCELLED | OUTPATIENT
Start: 2023-12-06

## 2023-01-01 RX ORDER — SODIUM PICOSULFATE, MAGNESIUM OXIDE, AND ANHYDROUS CITRIC ACID 10; 3.5; 12 MG/160ML; G/160ML; G/160ML
LIQUID ORAL
COMMUNITY
Start: 2023-01-01

## 2023-01-01 RX ORDER — PALONOSETRON 0.05 MG/ML
0.25 INJECTION, SOLUTION INTRAVENOUS ONCE
Status: COMPLETED | OUTPATIENT
Start: 2023-01-01 | End: 2023-01-01

## 2023-01-01 RX ORDER — FAMOTIDINE 10 MG/ML
20 INJECTION INTRAVENOUS ONCE AS NEEDED
Status: CANCELLED | OUTPATIENT
Start: 2023-12-06

## 2023-01-01 RX ORDER — DIPHENHYDRAMINE HYDROCHLORIDE 50 MG/ML
50 INJECTION INTRAMUSCULAR; INTRAVENOUS AS NEEDED
Status: DISCONTINUED | OUTPATIENT
Start: 2023-01-01 | End: 2023-01-01 | Stop reason: HOSPADM

## 2023-01-01 RX ORDER — METOPROLOL TARTRATE 25 MG/1
25 TABLET, FILM COATED ORAL 2 TIMES DAILY
COMMUNITY
Start: 2023-01-01

## 2023-01-01 RX ORDER — EPINEPHRINE 0.3 MG/.3ML
0.3 INJECTION SUBCUTANEOUS EVERY 5 MIN PRN
Status: DISCONTINUED | OUTPATIENT
Start: 2023-01-01 | End: 2023-01-01 | Stop reason: HOSPADM

## 2023-01-01 RX ORDER — FUROSEMIDE 40 MG/1
40 TABLET ORAL DAILY
COMMUNITY
Start: 2023-01-01 | End: 2023-01-01

## 2023-01-01 RX ORDER — ALBUTEROL SULFATE 90 UG/1
2 AEROSOL, METERED RESPIRATORY (INHALATION) EVERY 6 HOURS PRN
COMMUNITY
Start: 2023-01-01

## 2023-01-01 RX ORDER — PROCHLORPERAZINE MALEATE 10 MG
10 TABLET ORAL EVERY 6 HOURS PRN
Status: DISCONTINUED | OUTPATIENT
Start: 2023-01-01 | End: 2023-01-01 | Stop reason: HOSPADM

## 2023-01-01 RX ORDER — PROCHLORPERAZINE EDISYLATE 5 MG/ML
10 INJECTION INTRAMUSCULAR; INTRAVENOUS EVERY 6 HOURS PRN
Status: CANCELLED | OUTPATIENT
Start: 2023-01-01

## 2023-01-01 RX ORDER — DULOXETIN HYDROCHLORIDE 60 MG/1
60 CAPSULE, DELAYED RELEASE ORAL DAILY
COMMUNITY
Start: 2023-01-01

## 2023-01-01 RX ORDER — MECLIZINE HYDROCHLORIDE 25 MG/1
25 TABLET ORAL 3 TIMES DAILY PRN
COMMUNITY
Start: 2022-10-22

## 2023-01-01 RX ORDER — ACETAMINOPHEN 325 MG/1
650 TABLET ORAL ONCE
Status: CANCELLED | OUTPATIENT
Start: 2023-12-06

## 2023-01-01 RX ORDER — ERTAPENEM 1 G/1
1 INJECTION, POWDER, LYOPHILIZED, FOR SOLUTION INTRAMUSCULAR; INTRAVENOUS DAILY
COMMUNITY
Start: 2023-01-01

## 2023-01-01 RX ORDER — AMOXICILLIN AND CLAVULANATE POTASSIUM 875; 125 MG/1; MG/1
1 TABLET, FILM COATED ORAL EVERY 12 HOURS
COMMUNITY
Start: 2023-01-01 | End: 2023-01-01

## 2023-01-01 RX ORDER — FAMOTIDINE 10 MG/ML
20 INJECTION INTRAVENOUS ONCE AS NEEDED
Status: CANCELLED | OUTPATIENT
Start: 2023-12-08

## 2023-01-01 RX ORDER — ALBUTEROL SULFATE 0.83 MG/ML
3 SOLUTION RESPIRATORY (INHALATION) AS NEEDED
Status: CANCELLED | OUTPATIENT
Start: 2023-01-01

## 2023-01-01 RX ORDER — LIDOCAINE AND PRILOCAINE 25; 25 MG/G; MG/G
CREAM TOPICAL ONCE
Qty: 30 G | Refills: 5 | Status: SHIPPED | OUTPATIENT
Start: 2023-01-01 | End: 2023-01-01

## 2023-01-01 RX ORDER — PROCHLORPERAZINE MALEATE 10 MG
10 TABLET ORAL EVERY 6 HOURS PRN
Qty: 30 TABLET | Refills: 3 | Status: SHIPPED | OUTPATIENT
Start: 2023-01-01 | End: 2023-11-29

## 2023-01-01 RX ORDER — DIPHENHYDRAMINE HCL 50 MG
50 CAPSULE ORAL ONCE
Status: CANCELLED | OUTPATIENT
Start: 2023-12-06

## 2023-01-01 RX ORDER — HEPARIN 100 UNIT/ML
500 SYRINGE INTRAVENOUS AS NEEDED
Status: DISCONTINUED | OUTPATIENT
Start: 2023-01-01 | End: 2023-01-01 | Stop reason: HOSPADM

## 2023-01-01 RX ORDER — ALBUTEROL SULFATE 0.83 MG/ML
3 SOLUTION RESPIRATORY (INHALATION) AS NEEDED
Status: CANCELLED | OUTPATIENT
Start: 2023-12-06

## 2023-01-01 RX ORDER — PALONOSETRON 0.05 MG/ML
0.25 INJECTION, SOLUTION INTRAVENOUS ONCE
Status: CANCELLED | OUTPATIENT
Start: 2023-12-06

## 2023-01-01 RX ORDER — ONDANSETRON 4 MG/1
4 TABLET, FILM COATED ORAL EVERY 4 HOURS PRN
COMMUNITY
Start: 2023-01-01 | End: 2023-01-01 | Stop reason: ENTERED-IN-ERROR

## 2023-01-01 RX ORDER — ACETAMINOPHEN 325 MG/1
650 TABLET ORAL ONCE
Status: CANCELLED | OUTPATIENT
Start: 2023-01-01

## 2023-01-01 RX ORDER — FAMOTIDINE 10 MG/ML
20 INJECTION INTRAVENOUS ONCE AS NEEDED
Status: CANCELLED | OUTPATIENT
Start: 2023-12-07

## 2023-01-01 RX ORDER — ALBUTEROL SULFATE 0.83 MG/ML
3 SOLUTION RESPIRATORY (INHALATION) AS NEEDED
Status: CANCELLED | OUTPATIENT
Start: 2023-12-07

## 2023-01-01 RX ORDER — ASPIRIN 81 MG/1
81 TABLET ORAL DAILY
COMMUNITY

## 2023-01-01 RX ORDER — CHLORHEXIDINE GLUCONATE ORAL RINSE 1.2 MG/ML
15 SOLUTION DENTAL AS NEEDED
COMMUNITY
Start: 2023-01-01

## 2023-01-01 RX ORDER — DIPHENHYDRAMINE HCL 50 MG
50 CAPSULE ORAL ONCE
Status: CANCELLED | OUTPATIENT
Start: 2023-01-01

## 2023-01-01 RX ORDER — HYDROCORTISONE 25 MG/G
2.5 CREAM TOPICAL 2 TIMES DAILY
COMMUNITY
Start: 2023-01-01

## 2023-01-01 RX ORDER — DIPHENHYDRAMINE HCL 25 MG
25 CAPSULE ORAL ONCE
Status: CANCELLED
Start: 2023-01-01 | End: 2023-01-01

## 2023-01-01 RX ORDER — DEXAMETHASONE 1 MG/1
1 TABLET ORAL DAILY
COMMUNITY
Start: 2023-01-01

## 2023-01-01 RX ORDER — PROCHLORPERAZINE EDISYLATE 5 MG/ML
10 INJECTION INTRAMUSCULAR; INTRAVENOUS EVERY 6 HOURS PRN
Status: CANCELLED | OUTPATIENT
Start: 2023-12-07

## 2023-01-01 RX ORDER — FAMOTIDINE 10 MG/ML
20 INJECTION INTRAVENOUS ONCE AS NEEDED
Status: DISCONTINUED | OUTPATIENT
Start: 2023-01-01 | End: 2023-01-01 | Stop reason: HOSPADM

## 2023-01-01 RX ORDER — EPINEPHRINE 0.3 MG/.3ML
0.3 INJECTION SUBCUTANEOUS EVERY 5 MIN PRN
Status: CANCELLED | OUTPATIENT
Start: 2023-12-08

## 2023-01-01 RX ORDER — PREDNISONE 50 MG/1
50 TABLET ORAL DAILY
COMMUNITY
Start: 2023-01-01

## 2023-01-01 RX ORDER — IBUPROFEN 200 MG
1 TABLET ORAL EVERY 24 HOURS
COMMUNITY
Start: 2021-07-30

## 2023-01-01 RX ORDER — OMEPRAZOLE 20 MG/1
20 CAPSULE, DELAYED RELEASE ORAL DAILY
COMMUNITY
Start: 2023-01-01

## 2023-01-01 RX ORDER — DIPHENHYDRAMINE HCL 50 MG/1
50 CAPSULE ORAL NIGHTLY PRN
COMMUNITY
Start: 2023-01-01 | End: 2023-01-01 | Stop reason: ENTERED-IN-ERROR

## 2023-01-01 RX ORDER — DIPHENHYDRAMINE HYDROCHLORIDE 50 MG/ML
50 INJECTION INTRAMUSCULAR; INTRAVENOUS AS NEEDED
Status: CANCELLED | OUTPATIENT
Start: 2023-12-07

## 2023-01-01 RX ORDER — ALBUTEROL SULFATE 0.83 MG/ML
3 SOLUTION RESPIRATORY (INHALATION) AS NEEDED
Status: CANCELLED | OUTPATIENT
Start: 2023-12-08

## 2023-01-01 RX ORDER — PROCHLORPERAZINE MALEATE 10 MG
10 TABLET ORAL EVERY 6 HOURS PRN
Status: CANCELLED | OUTPATIENT
Start: 2023-01-01

## 2023-01-01 RX ORDER — DIPHENHYDRAMINE HCL 50 MG
50 CAPSULE ORAL ONCE
Status: COMPLETED | OUTPATIENT
Start: 2023-01-01 | End: 2023-01-01

## 2023-01-01 RX ORDER — METRONIDAZOLE 500 MG/1
500 TABLET ORAL 2 TIMES DAILY
COMMUNITY
Start: 2023-01-01 | End: 2023-01-01

## 2023-01-01 RX ORDER — CLOTRIMAZOLE 10 MG/1
10 LOZENGE ORAL; TOPICAL 3 TIMES DAILY
COMMUNITY
Start: 2023-01-01

## 2023-01-01 RX ORDER — PREDNISONE 5 MG/1
5 TABLET ORAL DAILY
COMMUNITY
Start: 2023-01-01 | End: 2023-01-01

## 2023-01-01 RX ORDER — DIPHENHYDRAMINE HYDROCHLORIDE 50 MG/ML
50 INJECTION INTRAMUSCULAR; INTRAVENOUS AS NEEDED
Status: CANCELLED | OUTPATIENT
Start: 2023-12-08

## 2023-01-01 RX ORDER — ACETAMINOPHEN 500 MG
50 TABLET ORAL DAILY
COMMUNITY

## 2023-01-01 RX ORDER — LEVOFLOXACIN 500 MG/1
1 TABLET, FILM COATED ORAL DAILY
COMMUNITY
Start: 2023-01-01

## 2023-01-01 RX ORDER — PROCHLORPERAZINE EDISYLATE 5 MG/ML
10 INJECTION INTRAMUSCULAR; INTRAVENOUS EVERY 6 HOURS PRN
Status: CANCELLED | OUTPATIENT
Start: 2023-12-06

## 2023-01-01 RX ORDER — ACETAMINOPHEN 325 MG/1
650 TABLET ORAL EVERY 6 HOURS PRN
Status: CANCELLED
Start: 2023-01-01

## 2023-01-01 RX ORDER — PALONOSETRON 0.05 MG/ML
0.25 INJECTION, SOLUTION INTRAVENOUS ONCE
Status: CANCELLED | OUTPATIENT
Start: 2023-01-01

## 2023-01-01 RX ORDER — TRAMADOL HYDROCHLORIDE 50 MG/1
50 TABLET ORAL EVERY 6 HOURS PRN
COMMUNITY
Start: 2023-01-01

## 2023-01-01 RX ORDER — EPINEPHRINE 0.3 MG/.3ML
0.3 INJECTION SUBCUTANEOUS EVERY 5 MIN PRN
Status: CANCELLED | OUTPATIENT
Start: 2023-12-07

## 2023-01-01 RX ADMIN — DIPHENHYDRAMINE HYDROCHLORIDE 50 MG: 50 CAPSULE ORAL at 11:05

## 2023-01-01 RX ADMIN — BENDAMUSTINE HYDROCHLORIDE 135 MG: 25 INJECTION, SOLUTION INTRAVENOUS at 11:47

## 2023-01-01 RX ADMIN — PALONOSETRON 250 MCG: 0.05 INJECTION, SOLUTION INTRAVENOUS at 11:05

## 2023-01-01 RX ADMIN — HEPARIN 500 UNITS: 100 SYRINGE at 13:17

## 2023-01-01 RX ADMIN — Medication 12 MG: at 10:30

## 2023-01-01 RX ADMIN — DEXAMETHASONE SODIUM PHOSPHATE 12 MG: 10 INJECTION, SOLUTION INTRAMUSCULAR; INTRAVENOUS at 11:54

## 2023-01-01 RX ADMIN — HEPARIN 500 UNITS: 100 SYRINGE at 12:59

## 2023-01-01 RX ADMIN — METHYLPREDNISOLONE SODIUM SUCCINATE 40 MG: 40 INJECTION, POWDER, FOR SOLUTION INTRAMUSCULAR; INTRAVENOUS at 09:36

## 2023-01-01 RX ADMIN — HEPARIN 500 UNITS: 100 SYRINGE at 12:06

## 2023-01-01 RX ADMIN — ACETAMINOPHEN 650 MG: 325 TABLET ORAL at 11:05

## 2023-01-01 RX ADMIN — DIPHENHYDRAMINE HYDROCHLORIDE 25 MG: 25 CAPSULE ORAL at 09:36

## 2023-01-01 RX ADMIN — ACETAMINOPHEN 650 MG: 325 TABLET ORAL at 09:35

## 2023-01-01 RX ADMIN — RITUXIMAB 563 MG: 10 INJECTION, SOLUTION INTRAVENOUS at 10:30

## 2023-01-01 RX ADMIN — HEPARIN 500 UNITS: 100 SYRINGE at 13:59

## 2023-01-01 RX ADMIN — BENDAMUSTINE HYDROCHLORIDE 135 MG: 25 INJECTION, SOLUTION INTRAVENOUS at 12:41

## 2023-01-01 RX ADMIN — PEGFILGRASTIM 6 MG: KIT SUBCUTANEOUS at 14:10

## 2023-01-01 ASSESSMENT — PAIN SCALES - GENERAL
PAINLEVEL: 0-NO PAIN

## 2023-01-01 ASSESSMENT — COLUMBIA-SUICIDE SEVERITY RATING SCALE - C-SSRS
1. IN THE PAST MONTH, HAVE YOU WISHED YOU WERE DEAD OR WISHED YOU COULD GO TO SLEEP AND NOT WAKE UP?: NO
2. HAVE YOU ACTUALLY HAD ANY THOUGHTS OF KILLING YOURSELF?: NO
6. HAVE YOU EVER DONE ANYTHING, STARTED TO DO ANYTHING, OR PREPARED TO DO ANYTHING TO END YOUR LIFE?: NO

## 2023-01-01 ASSESSMENT — PATIENT HEALTH QUESTIONNAIRE - PHQ9
2. FEELING DOWN, DEPRESSED OR HOPELESS: NOT AT ALL
1. LITTLE INTEREST OR PLEASURE IN DOING THINGS: NOT AT ALL
SUM OF ALL RESPONSES TO PHQ9 QUESTIONS 1 AND 2: 0

## 2023-01-01 ASSESSMENT — ENCOUNTER SYMPTOMS
OCCASIONAL FEELINGS OF UNSTEADINESS: 0
DEPRESSION: 0
LOSS OF SENSATION IN FEET: 0

## 2023-10-05 PROBLEM — C83.10 MANTLE CELL LYMPHOMA (MULTI): Status: ACTIVE | Noted: 2023-01-01

## 2023-10-05 PROBLEM — J44.9 CHRONIC OBSTRUCTIVE LUNG DISEASE (MULTI): Status: ACTIVE | Noted: 2021-06-30

## 2023-10-06 NOTE — PROGRESS NOTES
Cancer History:   Treatment Synopsis:    Flow cytometry on April 15, 2021 revealed findings consistent with CD5 positive monoclonal lymphocytes suggestive of mantle cell lymphoma.  Bone marrow aspiration and biopsy revealed monoclonal lambda in 30% cells.  CD5-013 negative. FISH studies revealed: t(11;14) (q13;q32) CCND1 (cyclin D1).  47-48,XX, t(3;12)(q21;q24.1)?c,amy(2)t(2;17)9p21;q11.2),t(2;11)(q21;q13),+add(12)(q24.1)amy(13)ins(13;?)(q14;?),-17,+1-2mar [2]/46,XX,t(3;12)(q21;24.1)?c[18]              History of Present Illness:      ID Statement:    TIMI JEFFERSON is a 72 year old Female        Interval History:    The patient had come for a follow up on 3/16/23 regarding mantle lymphoma. FISH studies positive for 11;14 and cyclin D1 positive.  Underwent triple bypass Feb 14 2022, had a-fib issues while in hospital and also had pneumonia.  The patient was admitted to [facility] in February 2023 with a diagnosis of diverticulitis.   The patient was placed on intravenous antibiotics.  During that stay CT scan revealed retroperitoneal mesenteric lymph nodes compatible with diagnosis of mantle cell lymphoma.  There was a left adrenal mass/nodule and hydronephrosis.  Bladder could not  be visualized.  Creatinine in the reference range.  The patient was discharged on home antibiotics and is now slowly improving.  She complains of easy fatigability diminished appetite but still able to carry on daily activities and lives alone.     The patient and family had come for follow-up on May 11, 2023 regarding history of mantle cell lymphoma.  As previously mentioned for studies positive for 11: 14, cyclin D1 positive.  S/p triple bypass February 14, 2022.  The patient describes abdominal  discomfort related to diverticulitis.  She is scheduled for a colonoscopy a week from now.  In addition, the patient also has left hydronephrosis.  Was recently evaluated by urology services and no intervention was recommended as  her kidney functions  have remained normal.  The patient had come for follow-up on August 10, 2023 regarding history of mantle cell lymphoma, 11: 14, cyclin D1 positive.  The patient complains of abdominal discomfort, and mucus in stool, diminished appetite and diminished oral intake culminating  in 3 to 5 pound weight loss.  The patient underwent upper and lower endoscopies on May 17, 2023.  Stomach antrum revealed erythema biopsy unremarkable noninflamed gastric mucosa.  Stomach body biopsy revealed moderate chronic inactive gastritis with increased  eosinophils.  Sigmoid colon biopsy with colonic mucosa with a small lymphoid aggregate.  The patient had called for a fu on 9/22/23 regarding ho mantle cell nhl.  Admitted to the hospital with bacteremia L hydronephrosis, abdominal lymphadenopathy, thickened colonic wallTTE negative.  She was placed on Ertapenem.  Developed a rash on legs.  Resume antibiotics after reasessment.    The patient and family had come for follow-up on October 6, 2023 regarding history of mantle cell non-Hodgkin's lymphoma.  Was generally diagnosed in April 2021.  The patient was followed clinically until now.  However since last few weeks we have noted recurrent symptoms such as progressive lymphadenopathy, left hydronephrosis, bacteremia, cellulitis of left leg.  The patient has improved but now complains of easy fatigability requesting assistance.        Past Medical History:   1. Elevated hemoglobin and hematocrit  2. Lupus necrotizing vasculitis  3. Cryoglobulinemic vasculitis   4. Right foot achilles tear  5. Hypothyroidism  6. Rheumatoid arthritis  7. A-fib.   8.  Coronary artery disease     Past Surgical History:   1. Splenectomy in 2008 for vasculitis at [facility]  2. Right leg pain achilles tear repair  3. Triple bypass. 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  Colonoscopy revealed colonic mucosa with small lymphoid aggregate     Family Medical History:   1. Father-  of MI  2. Mother- CAD and DM   3. Brother  of MI   4. Other brother with HTN      Review of Systems:   ·  System Review All other systems have been reviewed and are negative for complaint.            Allergies and Intolerances:       Allergies:         Augmentin: Drug, Rash, Anaphylaxis, Hives/Urticaria, Active         clindamycin: Drug, Unknown, Active         cefadroxil: Drug, Unknown, Active     Outpatient Medication Profile:  * Patient Currently Takes Medications as of 10-Aug-2023 10:43 documented in Structured Notes         Ativan 0.5 mg oral tablet : 1 tab(s) orally 4 times a day , Start Date: 15-Lucian-2021         meclizine:          aspirin 81 mg oral tablet:          Metamucil:          PriLOSEC 20 mg oral delayed release capsule: 1 cap(s) orally once a day             Surg History:         Sleep apnea: ICD-10: G47.30, Status: Active     Family History: No Family History items are recorded  in the problem list.      Social History:   Social Substance History:  ·  Smoking Status current every day smoker (1)   ·  Tobacco Use daily   ·  Drug Use denies   ·  Additional History     70 years old    with 2 children   Smoked 1 pack for 45 years (1)     Physical Exam:      Constitutional: Well developed, awake/alert/oriented  x3, no distress, alert and cooperative   Eyes: PERRL, EOMI, clear sclera   ENMT: mucous membranes moist, no apparent injury,  no lesions seen   Head/Neck: Neck supple, no apparent injury, thyroid  without mass or tenderness, No JVD, trachea midline, no bruits   Respiratory/Thorax: Patent airways, CTAB, normal  breath sounds with good chest expansion, thorax symmetric   Cardiovascular: Regular, rate and rhythm, no murmurs,  2+ equal pulses of the extremities, normal S 1and S 2   Gastrointestinal: Nondistended, soft,  non-tender,  no rebound tenderness or guarding, no masses palpable, no organomegaly, +BS, no bruits   Genitourinary: No Discharge, vesicles or other abnormalities   Musculoskeletal: ROM intact, no joint swelling, normal  strength   Extremities: normal extremities, no cyanosis edema,  contusions or wounds, no clubbing   Neurological: alert and oriented x3, intact senses,  motor, response and reflexes, normal strength   Breast: No masses, tenderness, no discharge or discoloration   Lymphatic: No significant lymphadenopathy   Psychological: Appropriate mood and behavior   Skin: Warm and dry, no lesions, no rashes         Lab Results:     ·  Results        CBC date/time       WBC     HGB     HCT     PLT     Neut      04-Aug-2023 11:16   81.1(HH)14.0    45.7    321     N/A     BMP date/time       NA              K               CL              CO2             BUN             CREAT             04-Aug-2023 11:16   140             4.5             105             N/A             18              0.75     LDH date/time       LDH     04-Aug-2023 11:16   184     Assessment and Plan:      Assessment and Plan:   Assessment:    The patient was referred to me by Dr. Daily for further evaluation and management of elevated hemoglobin and hematocrit on 04/28/2022.       1. Mantle lymphoma. FISH studies positive for 11;14 and cyclin D1 positive.     The patient had come for a follow up on 3/16/23.  She was admitted to [facility] in February 2023 with diverticulitis.  She was placed on intravenous antibiotics and discharged on oral antibiotics.  The patient has completed course of antibiotics  and is beginning to feel better but does have easy fatigability and diminished appetite.  In the hospital CT scan revealed retroperitoneal, mesenteric lymphadenopathy compatible with mantle cell lymphoma a left adrenal nodule/mass and hydronephrosis.   The patient does have appointment with urology services in early April and a CT scan  of the abdomen and pelvis.  I would like to wait for those results consider MRI of the adrenal gland.  At discharge WBC count was 50,000/mm³.  In April 2022 it was  26.1 suggesting doubling time greater than a year and relatively indolent course.  Physical exam was within normal limits. I reviewed the lab data with the patient.  The patient is pleased and agreeable.     The patient and family had come for follow-up on May 11, 2023 regarding history of mantle cell lymphoma.  She underwent triple bypass in 2022.  The patient also has left hydronephrosis and was recently evaluated by urology services.  No intervention was  recommended as her kidney functions are in normal range.  The patient also has history of diverticulitis and presents with abdominal discomfort.  She is scheduled for colonoscopy in 1 week.  I had a detailed discussion with the patient and explained to  her that I would like to see the results of colonoscopy and biopsies.  We have followed the patient clinically since the diagnosis and she has remained relatively asymptomatic.  Indeed, if there is evidence of mantle cell lymphoma with progression of  symptoms would be an indication to initiate therapy.  The patient understood appreciated all the details provided and was grateful.     The patient had come for follow-up on August 10, 2023 regarding history of mantle cell lymphoma, cyclin D1 positive, 11: 14 translocation originally diagnosed in 2021.  The patient is followed clinically.  Since last few months the patient complains of  abdominal discomfort, diminished appetite and oral intake and mucus in stool.  Upper endoscopy revealed findings suggestive of gastritis with eosinophilic infiltrate suggestive of eosinophilic gastritis.  I have personally discussed with gastroenterologist  Dr. Rebollar.  He as well as I feel that this is separate from mantle cell lymphoma.  Usually mantle cell lymphoma presents with GI abnormalities and polyps which are  positive on biopsy for lymphoma.  I suspect this is a separate entity possible celiac disease  the patient was recommended gluten-free diet.  However, the choices are so limited the patient is frustrated and requesting assistance.  Dr. Rebollar will prescribe prednisone.  Will reassess in 3 months if she continues to have symptoms and if the WBC count  keeps on increasing we might have to initiate chemotherapy.  The patient understood appreciated all the details provided and was grateful.     The patient had called for a fu on 9/22/23 regarding ho mantle cell nhl.  Admitted to the hospital with bacteremiaL hydronephrosis, abdominal lymphadenopathy, thickened colonic wallTTE negative.   She was placed on Ertapenem.  Developed a rash on legs. Resume antibiotics after reasessment.  Recommend urology evaluation for possible stent placement.  Consider chemotherapy after completion of antibiotics. RTC 2 weeks.    The patient and family had come for follow-up on October 6, 2023 regarding history of mantle cell lymphoma originally diagnosed in April 2021 and followed clinically until now.  We have noted that since last few weeks the patient has had recurrent symptoms requiring admission to the hospital with left hydronephrosis bacteremia progressive lymphadenopathy and progressive leukocytosis.  Had a detailed discussion with the patient explained to her that now that her symptoms are increasing in severity as well as frequency it would be prudent to consider chemotherapy.  Other option is to do nothing.  The patient is agreeable to consider chemotherapy.  Would recommend chemotherapy using Treanda 90 mg per metered squared day 1 day 2, Rituxan 375 mg per metered squared day 1 q. 4 weeks.  However, for first cycle Rituxan to be given on day #3.  All cycles supported with Neulasta.  This will necessitate port placement.  Recommend allopurinol 300 mg p.o. daily for a total of 14 days started 2 days prior to chemotherapy.  Effect  side effects explained.  Informed consent obtained.  The patient understood appreciated all the details provided and was grateful.  Return in 3 weeks.           2.  Elevated hemoglobin hematocrit, BCR ABL, JAK2, CALR, C282Y, H63D negative:     Most likely related to chronic hypoxia from smoking.  The patient has promised to stop smoking.  In addition, I have recommended sleep apnea studies.     3. Lupus necrotizing vasculitis     4. Cryoglobulinemic vasculitis      5. Right foot achilles tear     6. Hypothyroidism  - No medications at this time.      7. Rheumatoid arthritis

## 2023-10-10 NOTE — TELEPHONE ENCOUNTER
Patient called and had scan moved per request from 11/02/23 to 10/17/23 so we can schedule her chemo.

## 2023-10-12 NOTE — TELEPHONE ENCOUNTER
Returned call to patient.  Pt states she needs to see Dr. Lizeth Borges, Infection disease due to bacterial infection and Dr. Guajardo, Renal with Nuclear Scan which is scheduled on 10/17/23.  Pt aware she needs medical clearance from both Dr. Borges & Dr. Guajardo.  Tentative start of Rituxan/Treanda on 11/8/23 (Wed).  First cycle only pt will receive Days 1 & 2 Treanda followed by Rituxan on day 3.  Will submit port placement order @ [facility] per Ingrid FINK. Will change FUV with Dr. Fermin to be on 11/2 or 11/3 to sign chemo consent. Pt agreeable to this plan of care via verbal teach back method.

## 2023-10-18 NOTE — TELEPHONE ENCOUNTER
Patient to start chemo second week of November.  She was reviewing mychart and didn't see any labs requested.    Please advise if patient is to have labs prior to treatment.

## 2023-10-23 NOTE — TELEPHONE ENCOUNTER
Call placed to patient as a port flush appt is being requested to be scheduled for 10/30/23 @ 1:00.  This will allow all lab results to be resulted prior to seeing Dr. Fermin on 11/2/23.  Patient verbalizes understanding of plan of care via teachback method.

## 2023-10-24 NOTE — TELEPHONE ENCOUNTER
Message copies from another phone encounter from 10/23/23:    Call placed to patient as a port flush appt is being requested to be scheduled for 10/30/23 @ 1:00.  This will allow all lab results to be resulted prior to seeing Dr. Fermin on 11/2/23.  Patient verbalizes understanding of plan of care via teachback method.

## 2023-10-30 NOTE — PROGRESS NOTES
Port accessed easily with excellent blood return, lab work drawn. Port flushed and hepranized per protocol. Deaccessed. No complications.

## 2023-10-31 NOTE — TELEPHONE ENCOUNTER
Received notification from Dr. Fermin that patient's hepatitis B surface AB is Reactive (92.0).  Spoke to patient and she is already established with Dr. Rebollar. Call placed to Dr. Rebollar's office, spoke with Luann who is sending an urgent message to Dr. Rebollar to schedule appt for clearance. Patient is scheduled for first dose Rituxan/Bendamustine on 11/8/23.      Lab results from 10/30/23 successfully faxed to Leigha at FAX # [phone].

## 2023-10-31 NOTE — TELEPHONE ENCOUNTER
Call received from Dr. Rebollar/STEVE.  States he reviewed labs that were faxed to him and reports:  Hepatitis Surface Antigen is negative, Hepatitis Core Antibody negative, Hepatitis Antibody screen is positive.  He states this appears the patient has a history of hepatitis vaccination and low risk with using Rituxan.  Informed I will take the information as Dr. Fermin's nurse however asking Dr. Rebollar to call Dr. Fermin to inform physician to physician.  Dr. Rebollar agrees and will call Dr. Fermin's cell phone to discuss.  Dr. Fermin informed that Dr. Rebollar will be calling his cell.

## 2023-11-01 NOTE — TELEPHONE ENCOUNTER
Per Dr. Fermin, he spoke with Dr. Rebollar at [facility].  He needs to find out if patient was vaccinated since it appears to be vaccination related.    Spoke with patient she will check to see if she has any records of this.  To discuss with Dr. Fermin at appointment tomorrow.

## 2023-11-02 NOTE — PROGRESS NOTES
Patient seen by Dr. Fermin today in clinic. Dr. Fermin spoke with Dr. Rebollar and both providers aware of positive Hepatitis B Surface Antibody.  Pt unaware if she received previous hepatitis vaccinations and patient also confirmed with PCP Dr. Daily and they do not see any hepatitis vaccine history in their records.  Patient checking with Dr. Daily office to see if they can give hepatitis vaccine as pt will need vaccination asap however is still cleared by Dr. Fermin and Dr. Rebollar to start first cycle Rituxan/Bendamustine next week starting on 11/8/23.  Consent signed today by provider and patient for Rituxan/Bendamustine.  Education reinforced regarding antinausea plan and Allopurinol schedule. Patient verbalizes understanding of plan of care via teachback method.

## 2023-11-02 NOTE — PROGRESS NOTES
Cancer History:   Treatment Synopsis:    Flow cytometry on April 15, 2021 revealed findings consistent with CD5 positive monoclonal lymphocytes suggestive of mantle cell lymphoma.  Bone marrow aspiration and biopsy revealed monoclonal lambda in 30% cells.  CD5-013 negative. FISH studies revealed: t(11;14) (q13;q32) CCND1 (cyclin D1).  47-48,XX, t(3;12)(q21;q24.1)?c,amy(2)t(2;17)9p21;q11.2),t(2;11)(q21;q13),+add(12)(q24.1)amy(13)ins(13;?)(q14;?),-17,+1-2mar [2]/46,XX,t(3;12)(q21;24.1)?c[18]              History of Present Illness:      ID Statement:    TIMI JEFFERSON is a 72 year old Female        Interval History:    The patient had come for a follow up on 3/16/23 regarding mantle lymphoma. FISH studies positive for 11;14 and cyclin D1 positive.  Underwent triple bypass Feb 14 2022, had a-fib issues while in hospital and also had pneumonia.  The patient was admitted to [facility] in February 2023 with a diagnosis of diverticulitis.   The patient was placed on intravenous antibiotics.  During that stay CT scan revealed retroperitoneal mesenteric lymph nodes compatible with diagnosis of mantle cell lymphoma.  There was a left adrenal mass/nodule and hydronephrosis.  Bladder could not  be visualized.  Creatinine in the reference range.  The patient was discharged on home antibiotics and is now slowly improving.  She complains of easy fatigability diminished appetite but still able to carry on daily activities and lives alone.     The patient and family had come for follow-up on May 11, 2023 regarding history of mantle cell lymphoma.  As previously mentioned for studies positive for 11: 14, cyclin D1 positive.  S/p triple bypass February 14, 2022.  The patient describes abdominal  discomfort related to diverticulitis.  She is scheduled for a colonoscopy a week from now.  In addition, the patient also has left hydronephrosis.  Was recently evaluated by urology services and no intervention was recommended as  her kidney functions  have remained normal.  The patient had come for follow-up on August 10, 2023 regarding history of mantle cell lymphoma, 11: 14, cyclin D1 positive.  The patient complains of abdominal discomfort, and mucus in stool, diminished appetite and diminished oral intake culminating  in 3 to 5 pound weight loss.  The patient underwent upper and lower endoscopies on May 17, 2023.  Stomach antrum revealed erythema biopsy unremarkable noninflamed gastric mucosa.  Stomach body biopsy revealed moderate chronic inactive gastritis with increased  eosinophils.  Sigmoid colon biopsy with colonic mucosa with a small lymphoid aggregate.  The patient had called for a fu on 9/22/23 regarding ho mantle cell nhl.  Admitted to the hospital with bacteremia L hydronephrosis, abdominal lymphadenopathy, thickened colonic wallTTE negative.  She was placed on Ertapenem.  Developed a rash on legs.  Resume antibiotics after reasessment.    The patient and family had come for follow-up on November 2, 2023 regarding history of mantle cell non-Hodgkin's lymphoma.  Was generally diagnosed in April 2021.  The patient was followed clinically until now.  However since last few weeks we have noted recurrent symptoms such as progressive lymphadenopathy, left hydronephrosis, bacteremia, cellulitis of left leg.  The patient has improved but now complains of easy fatigability requesting assistance.        Past Medical History:   1. Elevated hemoglobin and hematocrit  2. Lupus necrotizing vasculitis  3. Cryoglobulinemic vasculitis   4. Right foot achilles tear  5. Hypothyroidism  6. Rheumatoid arthritis  7. A-fib.   8.  Coronary artery disease     Past Surgical History:   1. Spl[facility] in 2008 for [facility]  2. Right leg pain achilles tear repair  3. Triple bypass. 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  Colonoscopy revealed colonic mucosa with small lymphoid aggregate     Family Medical History:   1. Father-  of MI  2. Mother- CAD and DM   3. Brother  of MI   4. Other brother with HTN      Review of Systems:   ·  System Review All other systems have been reviewed and are negative for complaint.            Allergies and Intolerances:       Allergies:         Augmentin: Drug, Rash, Anaphylaxis, Hives/Urticaria, Active         clindamycin: Drug, Unknown, Active         cefadroxil: Drug, Unknown, Active     Outpatient Medication Profile:  * Patient Currently Takes Medications as of 10-Aug-2023 10:43 documented in Structured Notes         Ativan 0.5 mg oral tablet : 1 tab(s) orally 4 times a day , Start Date: 15-Lucian-2021         meclizine:          aspirin 81 mg oral tablet:          Metamucil:          PriLOSEC 20 mg oral delayed release capsule: 1 cap(s) orally once a day             Surg History:         Sleep apnea: ICD-10: G47.30, Status: Active     Family History: No Family History items are recorded  in the problem list.      Social History:   Social Substance History:  ·  Smoking Status current every day smoker (1)   ·  Tobacco Use daily   ·  Drug Use denies   ·  Additional History     70 years old    with 2 children   Smoked 1 pack for 45 years (1)     Physical Exam:      Constitutional: Well developed, awake/alert/oriented  x3, no distress, alert and cooperative   Eyes: PERRL, EOMI, clear sclera   ENMT: mucous membranes moist, no apparent injury,  no lesions seen   Head/Neck: Neck supple, no apparent injury, thyroid  without mass or tenderness, No JVD, trachea midline, no bruits   Respiratory/Thorax: Patent airways, CTAB, normal  breath sounds with good chest expansion, thorax symmetric   Cardiovascular: Regular, rate and rhythm, no murmurs,  2+ equal pulses of the extremities, normal S 1and S 2   Gastrointestinal: Nondistended, soft,  non-tender,  no rebound tenderness or guarding, no masses palpable, no organomegaly, +BS, no bruits   Genitourinary: No Discharge, vesicles or other abnormalities   Musculoskeletal: ROM intact, no joint swelling, normal  strength   Extremities: normal extremities, no cyanosis edema,  contusions or wounds, no clubbing   Neurological: alert and oriented x3, intact senses,  motor, response and reflexes, normal strength   Breast: No masses, tenderness, no discharge or discoloration   Lymphatic: No significant lymphadenopathy   Psychological: Appropriate mood and behavior   Skin: Warm and dry, no lesions, no rashes         Lab Results:     ·  Results        CBC date/time       WBC     HGB     HCT     PLT     Neut      04-Aug-2023 11:16   81.1(HH)14.0    45.7    321     N/A     BMP date/time       NA              K               CL              CO2             BUN             CREAT             04-Aug-2023 11:16   140             4.5             105             N/A             18              0.75     LDH date/time       LDH     04-Aug-2023 11:16   184     Assessment and Plan:      Assessment and Plan:   Assessment:    The patient was referred to me by Dr. Daily for further evaluation and management of elevated hemoglobin and hematocrit on 04/28/2022.       1. Mantle lymphoma. FISH studies positive for 11;14 and cyclin D1 positive.     The patient had come for a follow up on 3/16/23.  She was admitted to [facility] in February 2023 with diverticulitis.  She was placed on intravenous antibiotics and discharged on oral antibiotics.  The patient has completed course of antibiotics  and is beginning to feel better but does have easy fatigability and diminished appetite.  In the hospital CT scan revealed retroperitoneal, mesenteric lymphadenopathy compatible with mantle cell lymphoma a left adrenal nodule/mass and hydronephrosis.   The patient does have appointment with urology services in early April and a CT scan  of the abdomen and pelvis.  I would like to wait for those results consider MRI of the adrenal gland.  At discharge WBC count was 50,000/mm³.  In April 2022 it was  26.1 suggesting doubling time greater than a year and relatively indolent course.  Physical exam was within normal limits. I reviewed the lab data with the patient.  The patient is pleased and agreeable.     The patient and family had come for follow-up on May 11, 2023 regarding history of mantle cell lymphoma.  She underwent triple bypass in 2022.  The patient also has left hydronephrosis and was recently evaluated by urology services.  No intervention was  recommended as her kidney functions are in normal range.  The patient also has history of diverticulitis and presents with abdominal discomfort.  She is scheduled for colonoscopy in 1 week.  I had a detailed discussion with the patient and explained to  her that I would like to see the results of colonoscopy and biopsies.  We have followed the patient clinically since the diagnosis and she has remained relatively asymptomatic.  Indeed, if there is evidence of mantle cell lymphoma with progression of  symptoms would be an indication to initiate therapy.  The patient understood appreciated all the details provided and was grateful.     The patient had come for follow-up on August 10, 2023 regarding history of mantle cell lymphoma, cyclin D1 positive, 11: 14 translocation originally diagnosed in 2021.  The patient is followed clinically.  Since last few months the patient complains of  abdominal discomfort, diminished appetite and oral intake and mucus in stool.  Upper endoscopy revealed findings suggestive of gastritis with eosinophilic infiltrate suggestive of eosinophilic gastritis.  I have personally discussed with gastroenterologist  Dr. Rebollar.  He as well as I feel that this is separate from mantle cell lymphoma.  Usually mantle cell lymphoma presents with GI abnormalities and polyps which are  positive on biopsy for lymphoma.  I suspect this is a separate entity possible celiac disease  the patient was recommended gluten-free diet.  However, the choices are so limited the patient is frustrated and requesting assistance.  Dr. Rebollar will prescribe prednisone.  Will reassess in 3 months if she continues to have symptoms and if the WBC count  keeps on increasing we might have to initiate chemotherapy.  The patient understood appreciated all the details provided and was grateful.     The patient had called for a fu on 9/22/23 regarding ho mantle cell nhl.  Admitted to the hospital with bacteremiaL hydronephrosis, abdominal lymphadenopathy, thickened colonic wallTTE negative.   She was placed on Ertapenem.  Developed a rash on legs. Resume antibiotics after reasessment.  Recommend urology evaluation for possible stent placement.  Consider chemotherapy after completion of antibiotics. RTC 2 weeks.    The patient and family had come for follow-up on November 2 2023 regarding history of mantle cell lymphoma originally diagnosed in April 2021 and followed clinically until now.  We have noted that since last few weeks the patient has had recurrent symptoms requiring admission to the hospital with left hydronephrosis bacteremia progressive lymphadenopathy and progressive leukocytosis.  Had a detailed discussion with the patient explained to her that now that her symptoms are increasing in severity as well as frequency it would be prudent to consider chemotherapy.  Other option is to do nothing.  The patient is agreeable to consider chemotherapy.  Would recommend chemotherapy using Treanda 90 mg per metered squared day 1 day 2, Rituxan 375 mg per metered squared day 1 q. 4 weeks.  However, for first cycle Rituxan to be given on day #3.  All cycles supported with Neulasta.  This will necessitate port placement.  Recommend allopurinol 300 mg p.o. daily for a total of 14 days started 2 days prior to chemotherapy.  Effect  side effects explained.  Informed consent obtained.  The patient understood appreciated all the details provided and was grateful.  Cycle 1 on November 8, 2023.  Hepatitis B antibody is positive.  I have personally discussed with GI services.  Most likely from past vaccination although patient does not recall.  May need revaccination but it is okay to start chemotherapy.  Return in 4 weeks.           2.  Elevated hemoglobin hematocrit, BCR ABL, JAK2, CALR, C282Y, H63D negative:     Most likely related to chronic hypoxia from smoking.  The patient has promised to stop smoking.  In addition, I have recommended sleep apnea studies.     3. Lupus necrotizing vasculitis     4. Cryoglobulinemic vasculitis      5. Right foot achilles tear     6. Hypothyroidism  - No medications at this time.      7. Rheumatoid arthritis

## 2023-11-03 PROBLEM — G47.30 SLEEP APNEA, UNSPECIFIED: Status: ACTIVE | Noted: 2022-06-21

## 2023-11-03 PROBLEM — R41.89 COGNITIVE DEFICITS: Status: ACTIVE | Noted: 2022-03-14

## 2023-11-03 PROBLEM — E44.0 MODERATE MALNUTRITION (MULTI): Status: ACTIVE | Noted: 2022-03-10

## 2023-11-03 PROBLEM — Z95.1 S/P CABG (CORONARY ARTERY BYPASS GRAFT): Status: ACTIVE | Noted: 2022-02-22

## 2023-11-03 PROBLEM — F41.8 DEPRESSION WITH ANXIETY: Status: ACTIVE | Noted: 2022-03-14

## 2023-11-03 PROBLEM — E78.5 HYPERLIPIDEMIA: Status: ACTIVE | Noted: 2022-02-22

## 2023-11-03 PROBLEM — M32.9 LUPUS (MULTI): Status: ACTIVE | Noted: 2021-04-28

## 2023-11-03 PROBLEM — A41.9 SEPSIS (MULTI): Status: ACTIVE | Noted: 2023-01-01

## 2023-11-03 PROBLEM — R44.3 HALLUCINATIONS: Status: ACTIVE | Noted: 2022-03-14

## 2023-11-03 PROBLEM — I10 HYPERTENSION: Status: ACTIVE | Noted: 2022-02-22

## 2023-11-03 PROBLEM — E03.9 HYPOTHYROIDISM: Status: ACTIVE | Noted: 2022-04-21

## 2023-11-03 PROBLEM — K57.92 DIVERTICULITIS: Status: ACTIVE | Noted: 2023-01-01

## 2023-11-03 PROBLEM — B07.9 WARTS: Status: ACTIVE | Noted: 2022-03-09

## 2023-11-03 PROBLEM — I48.0 PAROXYSMAL ATRIAL FIBRILLATION (MULTI): Status: ACTIVE | Noted: 2022-02-22

## 2023-11-03 PROBLEM — F17.200 TOBACCO DEPENDENCE: Status: ACTIVE | Noted: 2022-02-22

## 2023-11-03 PROBLEM — J18.9 PNEUMONIA: Status: ACTIVE | Noted: 2022-03-09

## 2023-11-03 PROBLEM — I25.119 ATHEROSCLEROTIC HEART DISEASE OF NATIVE CORONARY ARTERY WITH UNSPECIFIED ANGINA PECTORIS (CMS-HCC): Status: ACTIVE | Noted: 2023-01-01

## 2023-11-03 PROBLEM — R78.81 BACTEREMIA: Status: ACTIVE | Noted: 2023-01-01

## 2023-11-08 NOTE — SIGNIFICANT EVENT
11/08/23 1023   Prechemo Checklist   Has the patient been in the hospital, ED, or urgent care since last date of service No   Chemo/Immuno Consent Signed Yes   Protocol/Indications Verified Yes   Confirmed to previous date/time of medication Yes   Compared to previous dose Yes   All medications are dated accurately Yes   Pregnancy Test Negative Not applicable   Parameters Met Yes   BSA/Weight-Height Verified Yes   Dose Calculations Verified Yes

## 2023-11-08 NOTE — SIGNIFICANT EVENT
11/08/23 1023   Prechemo Checklist   Has the patient been in the hospital, ED, or urgent care since last date of service No   Chemo/Immuno Consent Signed Yes   Pregnancy Test Negative Not applicable   Parameters Met Yes   BSA/Weight-Height Verified Yes   Dose Calculations Verified Yes

## 2023-11-08 NOTE — PROGRESS NOTES
Port flushed and hepranized per protocol. Deaccessed. No complications. Treatment completed. Patient tolerated well. Remained asymptomatic throughout. Discharged home ambulatory with son offering no complaints.

## 2023-11-09 NOTE — SIGNIFICANT EVENT
11/09/23 1208   Prechemo Checklist   Has the patient been in the hospital, ED, or urgent care since last date of service No   Chemo/Immuno Consent Signed Yes   Protocol/Indications Verified Yes   Confirmed to previous date/time of medication Yes   Compared to previous dose Yes   All medications are dated accurately Yes   Pregnancy Test Negative Not applicable   Parameters Met Yes   BSA/Weight-Height Verified Yes   Dose Calculations Verified Yes

## 2023-11-09 NOTE — PROGRESS NOTES
Port flushed and hepranized per protocol. Deaccessed. No complications.Treatment completed. Patient tolerated well. Remained asymptomatic throughout. Discharged home ambulatory with son offering no complaints.

## 2023-11-10 NOTE — PROGRESS NOTES
Patient presented today for her first treatment.  CHARLOTTE reviewed previous note from March.  Patient was very pleasant and easily engaged and remained familiar with SW.  She reports doing well.  Her sons remain supportive.  She is in an apartment and has neighbors nearby as well.  She has friends she talks to often.  Explored some of the challenges of getting out of house but also being careful given having an immune compromised system and undergoing cancer treatment.  Engaged in discussion and support.  Patient was appreciative of the visit.  She did not express any particular concerns at this time.  SW will continue to follow.

## 2023-11-10 NOTE — SIGNIFICANT EVENT
11/10/23 0839   Prechemo Checklist   Has the patient been in the hospital, ED, or urgent care since last date of service No   Chemo/Immuno Consent Signed Yes   Protocol/Indications Verified Yes   Confirmed to previous date/time of medication Yes   Compared to previous dose Yes   All medications are dated accurately Yes   Pregnancy Test Negative Not applicable   Parameters Met Yes   BSA/Weight-Height Verified Yes   Dose Calculations Verified Yes

## 2023-11-10 NOTE — PROGRESS NOTES
Pt here for first dose Rituxan.  Spoke to pt and advised I am sending a request for an appt for first dose Hepatitis vaccine to be giving on 11/17/23 with patient's scheduled walk in lab.  Pt verbalizes understanding.

## 2023-11-10 NOTE — PROGRESS NOTES
Port flushed and hepranized per protocol. Deaccessed. No complications. Treatment completed. Patient tolerated well. ONPRO applied. Remained asymptomatic throughout. Discharged home ambulatory offering no complaints.

## 2023-11-13 NOTE — TELEPHONE ENCOUNTER
"Pt calling with c/o right leg/hip pain, states sciatic hip area down thigh to chin bone and behind calf. Rates  6-7/10 \"toothache\" pain.  Took tylenol with no relief @ 8:00 this morning.  Denies swelling or redness.  Reports temperature \"slightly warmer in the right leg\".   Assessed matt's over the phone with patient flexing her feet towards head with no change in pain in either leg.  Denies shortness of breath or chest pain.    Also reports she had nausea this morning and took zofran with complete relief of nausea.    Informed pt I will speak with Dr. Fermin to see if he wants further testing done and will return her call with plan of care.  Pt denies further questions and agreeable to this plan of care.    "

## 2023-11-13 NOTE — TELEPHONE ENCOUNTER
Dr. Fermin placed orders for STAT Bilateral Doppler study.  Pt scheduled in [place] 11/14/23 @ 11:00.  Confirmed with patient of this new appt and she is able to have study done.  Informed STAT criteria is within 24-48 hours per , Gissel.  Informed pt if she has any worsening pain, swelling, redness she should go to an emergency room.  Pt verbalizes understanding.  Informed I will follow up with her regarding results tomorrow.  Patient verbalizes understanding of plan of care via teachback method.

## 2023-11-15 NOTE — TELEPHONE ENCOUNTER
"Call again placed to patient regarding US results.  US results reviewed.  Jailyn states she has had nausea since last night.  Taking Ondansetron with some relief.  Jailyn is going to stagger with Compazine and try taking a dose of Compazine right prior to bedtime to assist with sleeping.  Denies constipation or diarrha.  States she has noted a low grade fever of around \"99\" last several days.  Today patient states temp is 102.3.  Took while on the phone and is 101.6. States she \"took a Tylenol this AM and it brought temp down slightly for a period of time.  Discussed redlist when to call with patient.  Explained that temperature 100.4 or higher is a reason right away.  Discussed Tylenol should not be taken for a fever unless prescribed by provider.  Spoke with Dr Fermin by phone. Per Dr Fermin - Patient to come in tomorrow morning for labs and cultures if she is feeling OK.  If she is not feeling well patient to proceed to ER.   Call returned to patient.  Patient notified of providers recommendation.  Patient states she will go to [facility].  Will have family transport.  Patient reminded to bring yellow card and notify triage she is a patient of Dr Fermin.  Call back instructions reviewed.  Patient verbalized understanding.   Report provided to [facility].    "

## 2023-11-15 NOTE — TELEPHONE ENCOUNTER
CONCLUSIONS:  Left Lower Venous Insufficiency: There is reflux noted in the popliteal vein.  Right Lower Venous: No evidence of acute deep vein thrombus visualized in the right lower extremity.  Left Lower Venous: No evidence of acute deep vein thrombus visualized in the left lower extremity. There are chronic changes visualized in the popliteal vein.    Call pl aced to patient.  No answer.  Message left on non-identifiable voicemail.  Call back instructions reviewed.  Patient verbalized understanding.

## 2023-11-27 ENCOUNTER — TELEPHONE (OUTPATIENT)
Dept: HEMATOLOGY/ONCOLOGY | Facility: CLINIC | Age: 73
End: 2023-11-27
Payer: MEDICARE

## 2023-11-30 ENCOUNTER — APPOINTMENT (OUTPATIENT)
Dept: HEMATOLOGY/ONCOLOGY | Facility: CLINIC | Age: 73
End: 2023-11-30
Payer: MEDICARE

## 2023-12-06 ENCOUNTER — APPOINTMENT (OUTPATIENT)
Dept: HEMATOLOGY/ONCOLOGY | Facility: CLINIC | Age: 73
End: 2023-12-06
Payer: MEDICARE

## 2023-12-07 ENCOUNTER — APPOINTMENT (OUTPATIENT)
Dept: HEMATOLOGY/ONCOLOGY | Facility: CLINIC | Age: 73
End: 2023-12-07
Payer: MEDICARE

## 2023-12-07 ENCOUNTER — APPOINTMENT (OUTPATIENT)
Dept: HEMATOLOGY/ONCOLOGY | Facility: HOSPITAL | Age: 73
End: 2023-12-07
Payer: MEDICARE

## 2023-12-08 ENCOUNTER — APPOINTMENT (OUTPATIENT)
Dept: HEMATOLOGY/ONCOLOGY | Facility: HOSPITAL | Age: 73
End: 2023-12-08
Payer: MEDICARE

## 2023-12-08 ENCOUNTER — APPOINTMENT (OUTPATIENT)
Dept: HEMATOLOGY/ONCOLOGY | Facility: CLINIC | Age: 73
End: 2023-12-08
Payer: MEDICARE